# Patient Record
Sex: FEMALE | Race: ASIAN | NOT HISPANIC OR LATINO | URBAN - METROPOLITAN AREA
[De-identification: names, ages, dates, MRNs, and addresses within clinical notes are randomized per-mention and may not be internally consistent; named-entity substitution may affect disease eponyms.]

---

## 2017-02-15 ENCOUNTER — COMMUNICATION - HEALTHEAST (OUTPATIENT)
Dept: INTERNAL MEDICINE | Facility: CLINIC | Age: 52
End: 2017-02-15

## 2017-03-20 ENCOUNTER — OFFICE VISIT - HEALTHEAST (OUTPATIENT)
Dept: INTERNAL MEDICINE | Facility: CLINIC | Age: 52
End: 2017-03-20

## 2017-03-20 DIAGNOSIS — I10 HYPERTENSION: ICD-10-CM

## 2017-03-20 DIAGNOSIS — Z00.00 ANNUAL PHYSICAL EXAM: ICD-10-CM

## 2017-03-20 DIAGNOSIS — K21.9 GASTROESOPHAGEAL REFLUX DISEASE WITHOUT ESOPHAGITIS: ICD-10-CM

## 2017-03-20 DIAGNOSIS — Z86.39 HISTORY OF THYROID DISEASE: ICD-10-CM

## 2017-03-20 DIAGNOSIS — Z11.1 SCREENING-PULMONARY TB: ICD-10-CM

## 2017-03-20 DIAGNOSIS — R76.12 REACTION TO QUANTIFERON-TB TEST: ICD-10-CM

## 2017-03-20 LAB
CHOLEST SERPL-MCNC: 191 MG/DL
FASTING STATUS PATIENT QL REPORTED: ABNORMAL
HDLC SERPL-MCNC: 42 MG/DL
LDLC SERPL CALC-MCNC: 118 MG/DL
TRIGL SERPL-MCNC: 155 MG/DL

## 2017-03-20 ASSESSMENT — MIFFLIN-ST. JEOR: SCORE: 1017.05

## 2017-03-23 LAB
HPV INTERPRETATION - HISTORICAL: NORMAL
HPV INTERPRETER - HISTORICAL: NORMAL

## 2017-03-28 ENCOUNTER — RECORDS - HEALTHEAST (OUTPATIENT)
Dept: ADMINISTRATIVE | Facility: OTHER | Age: 52
End: 2017-03-28

## 2017-03-28 ENCOUNTER — COMMUNICATION - HEALTHEAST (OUTPATIENT)
Dept: INTERNAL MEDICINE | Facility: CLINIC | Age: 52
End: 2017-03-28

## 2017-03-29 ENCOUNTER — COMMUNICATION - HEALTHEAST (OUTPATIENT)
Dept: INTERNAL MEDICINE | Facility: CLINIC | Age: 52
End: 2017-03-29

## 2017-11-21 ENCOUNTER — COMMUNICATION - HEALTHEAST (OUTPATIENT)
Dept: INTERNAL MEDICINE | Facility: CLINIC | Age: 52
End: 2017-11-21

## 2017-11-21 DIAGNOSIS — K21.9 GASTROESOPHAGEAL REFLUX DISEASE WITHOUT ESOPHAGITIS: ICD-10-CM

## 2017-11-21 RX ORDER — LANSOPRAZOLE 30 MG/1
30 CAPSULE, DELAYED RELEASE ORAL DAILY
Qty: 90 CAPSULE | Refills: 0 | Status: SHIPPED | OUTPATIENT
Start: 2017-11-21

## 2018-03-23 ENCOUNTER — COMMUNICATION - HEALTHEAST (OUTPATIENT)
Dept: INTERNAL MEDICINE | Facility: CLINIC | Age: 53
End: 2018-03-23

## 2018-03-23 DIAGNOSIS — I10 HYPERTENSION: ICD-10-CM

## 2018-03-27 ENCOUNTER — COMMUNICATION - HEALTHEAST (OUTPATIENT)
Dept: INTERNAL MEDICINE | Facility: CLINIC | Age: 53
End: 2018-03-27

## 2018-03-27 DIAGNOSIS — I10 HYPERTENSION: ICD-10-CM

## 2018-03-28 RX ORDER — METOPROLOL SUCCINATE 25 MG/1
TABLET, EXTENDED RELEASE ORAL
Qty: 90 TABLET | Refills: 0 | Status: SHIPPED | OUTPATIENT
Start: 2018-03-28

## 2021-05-30 VITALS — WEIGHT: 115.6 LBS | HEIGHT: 59 IN | BODY MASS INDEX: 23.31 KG/M2

## 2021-06-09 NOTE — PROGRESS NOTES
Assessment/Plan:     1. Annual physical exam  2. Hypertension  3. History of thyroid disease  4. Gastroesophageal reflux disease without esophagitis  5. Reaction to QuantiFERON-TB test  6. Screening-pulmonary TB  - Lipid Profile  - Gynecologic Cytology (PAP Smear)  - Reviewed the importance of monitoring for changes in breast appearance such as nipple inversion, changes in breast tissue texture, non-healing wounds, or nipple discharge   - She is advised that she does not need to continue to have screening for hepatitis B, her late  had Hep B but she was screened for this last year. No new sexual partners  - Continue current medications      Subjective:     Jodi Mann is a 51 y.o. female who presents for an annual exam. Her last Pap smear was 2-3 years ago, she is uncertain.  She has not had any abnormal Pap smears in the past.    Her late  had hepatitis B. she was tested for this in October and negative.  She wonders if she needs to be retested.  She denies any new sexual partners or blood exposure.    She has a history of GERD and this is well controlled with Prevacid.  She has reflux if she forgets the medication.     We reviewed recent evaluation of a cough in October.  Chest x-ray was negative but she had a low positive QuantiFERON results.  We had difficulty in reaching her to schedule a recheck so this has not yet been done.  She moved to the [place] from [place] 5 years ago.  She denies any known exposures to tuberculosis.  She has not had previous treatment for tuberculosis.  She does recall having a vaccine for TB in the past.     She wonders about bone density testing, she has never had this done before.     The patient reports that there is not domestic violence in her life.     Healthy Habits:   Regular Exercise: Yes, 7 days per week. Stretching exercises mostly.   Sunscreen Use: Yes  Healthy Diet: Yes  Dental Visits Regularly: No, over 1 year ago   Mammogram: yes, UTD  2016  Colonoscopy: Yes, 2016       Immunization History   Administered Date(s) Administered     Tdap 10/21/2016     Immunization status: UTD and documented     Gynecologic History  Patient's last menstrual period was 2016.  Last Pap: 2-3 years ago. Results were: normal by report   Last mammogram: 16 Results were: normal     OB History    Para Term  AB SAB TAB Ectopic Multiple Living   2 2 2             # Outcome Date GA Lbr José Miguel/2nd Weight Sex Delivery Anes PTL Lv   2 Term            1 Term                   Current Outpatient Prescriptions   Medication Sig Dispense Refill     acetaminophen (TYLENOL) 325 MG tablet Take 1-2 tablets (325-650 mg total) by mouth every 6 (six) hours as needed for pain. 100 tablet 2     ALLERGY EYE, KETOTIFEN, 0.025 % (0.035 %) ophthalmic solution        ARTIFICIAL TEARS,GLYCERIN-PEG, 1-0.3 % Drop        chlorhexidine (PERIDEX) 0.12 % solution Apply 15 mL to the mouth or throat 2 (two) times a day. 120 mL 11     lansoprazole (PREVACID) 30 MG capsule Take 1 capsule (30 mg total) by mouth daily. 90 capsule 3     metoprolol succinate (TOPROL-XL) 25 MG Take 1 tablet (25 mg total) by mouth daily. 90 tablet 3     No current facility-administered medications for this visit.      Past Medical History:   Diagnosis Date     Gastroesophageal reflux disease without esophagitis 2016     Hypertension 2016     Past Surgical History:   Procedure Laterality Date     THYROIDECTOMY, PARTIAL       Review of patient's allergies indicates no known allergies.  No family history on file.  Social History     Social History     Marital status:      Spouse name: N/A     Number of children: N/A     Years of education: N/A     Occupational History     Not on file.     Social History Main Topics     Smoking status: Never Smoker     Smokeless tobacco: Not on file     Alcohol use No     Drug use: No     Sexual activity: Not on file     Other Topics Concern     Not on file  "    Social History Narrative       Review of Systems  General:  Negative except as noted above  Eyes: Negative except as noted above  Ears/Nose/Throat: Negative except as noted above  Cardiovascular: Negative except as noted above  Respiratory:  Negative except as noted above  Gastrointestinal:  Negative except as noted above  Musculoskeletal:  Negative except as noted above  Skin: Negative except as noted above  Neurologic: Negative except as noted above  Psychiatric: Negative except as noted above  Endocrine: Negative except as noted above  Heme/Lymphatic: Negative except as noted above   Allergic/Immunologic: Negative except as noted above      Objective:      Vitals:    03/20/17 0842   BP: 130/78   Patient Site: Right Arm   Patient Position: Sitting   Cuff Size: Adult Regular   Pulse: 69   Weight: 115 lb 9.6 oz (52.4 kg)   Height: 4' 10.5\" (1.486 m)     Wt Readings from Last 3 Encounters:   03/20/17 115 lb 9.6 oz (52.4 kg)   10/21/16 114 lb 3.2 oz (51.8 kg)   08/17/16 111 lb 9.6 oz (50.6 kg)     Body mass index is 23.75 kg/(m^2).     Physical Exam:  General Appearance: Alert, cooperative, no distress  Head: Normocephalic, without obvious abnormality, atraumatic  Eyes: PERRL, conjunctiva/corneas clear, EOM's intact  Ears: Normal TM's and external ear canals, both ears  Nose: Nares normal, septum midline, mucosa normal, no drainage  Throat: Lips, mucosa, and tongue normal  Neck: Supple, symmetrical, trachea midline, no adenopathy;  thyroid: not enlarged, symmetric, no tenderness/mass/nodules  Back: Symmetric, no curvature, ROM normal, no CVA tenderness  Lungs: Clear to auscultation bilaterally, respirations unlabored  Heart: Regular rate and rhythm, S1 and S2 normal, no murmur, rub, or gallop  Abdomen: Soft, non-tender, bowel sounds active all four quadrants,  no masses, no organomegaly  Pelvic: Genital: EXTERNAL GENITALIA: Normal appearing vulva without masses, tenderness or lesions. PERINEUM: normal and intact. " URETHRAL MEATUS: normal VAGINA:  vagina with normal color and without discharge or lesions. CERVIX: normal appearing cervix without discharge or lesions. Non-friable. No CMT. UTERUS: uterus is normal size, shape, consistency, and non-tender. ADNEXA: no tenderness or fullness.   Extremities: Extremities normal, atraumatic, no cyanosis or edema  Skin: Skin color, texture, turgor normal, no rashes or lesions  Lymph nodes: Cervical, supraclavicular nodes normal  Neurologic: Normal  Psych: Normal affect.  Does not appear anxious or depressed.